# Patient Record
Sex: FEMALE | Race: WHITE | Employment: OTHER | URBAN - METROPOLITAN AREA
[De-identification: names, ages, dates, MRNs, and addresses within clinical notes are randomized per-mention and may not be internally consistent; named-entity substitution may affect disease eponyms.]

---

## 2017-01-01 ENCOUNTER — OFFICE VISIT (OUTPATIENT)
Dept: PODIATRY CLINIC | Facility: CLINIC | Age: 82
End: 2017-01-01

## 2017-01-01 ENCOUNTER — OFFICE VISIT (OUTPATIENT)
Dept: INTERNAL MEDICINE CLINIC | Facility: CLINIC | Age: 82
End: 2017-01-01

## 2017-01-01 ENCOUNTER — HOSPITAL ENCOUNTER (OUTPATIENT)
Dept: GENERAL RADIOLOGY | Age: 82
Discharge: HOME | End: 2017-01-01
Attending: PODIATRIST
Payer: MEDICARE

## 2017-01-01 ENCOUNTER — TELEPHONE (OUTPATIENT)
Dept: INTERNAL MEDICINE CLINIC | Facility: CLINIC | Age: 82
End: 2017-01-01

## 2017-01-01 ENCOUNTER — APPOINTMENT (OUTPATIENT)
Dept: GENERAL RADIOLOGY | Facility: HOSPITAL | Age: 82
End: 2017-01-01
Payer: MEDICARE

## 2017-01-01 ENCOUNTER — APPOINTMENT (OUTPATIENT)
Dept: GENERAL RADIOLOGY | Facility: HOSPITAL | Age: 82
DRG: 871 | End: 2017-01-01
Attending: EMERGENCY MEDICINE
Payer: MEDICARE

## 2017-01-01 ENCOUNTER — HOSPITAL ENCOUNTER (EMERGENCY)
Facility: HOSPITAL | Age: 82
Discharge: HOME | End: 2017-01-01
Payer: MEDICARE

## 2017-01-01 ENCOUNTER — HOSPITAL ENCOUNTER (INPATIENT)
Facility: HOSPITAL | Age: 82
LOS: 3 days | Discharge: NURSING, REHAB OR LONG-TERM CARE | DRG: 184 | End: 2017-01-01
Attending: EMERGENCY MEDICINE | Admitting: HOSPITALIST
Payer: MEDICARE

## 2017-01-01 ENCOUNTER — TELEPHONE (OUTPATIENT)
Dept: OTHER | Age: 82
End: 2017-01-01

## 2017-01-01 ENCOUNTER — HOSPITAL ENCOUNTER (OUTPATIENT)
Dept: MAMMOGRAPHY | Age: 82
Discharge: HOME | End: 2017-01-01
Attending: INTERNAL MEDICINE
Payer: MEDICARE

## 2017-01-01 ENCOUNTER — APPOINTMENT (OUTPATIENT)
Dept: GENERAL RADIOLOGY | Facility: HOSPITAL | Age: 82
DRG: 184 | End: 2017-01-01
Attending: EMERGENCY MEDICINE
Payer: MEDICARE

## 2017-01-01 ENCOUNTER — HOSPITAL ENCOUNTER (OUTPATIENT)
Dept: GENERAL RADIOLOGY | Age: 82
Discharge: HOME | End: 2017-01-01
Attending: INTERNAL MEDICINE
Payer: MEDICARE

## 2017-01-01 ENCOUNTER — APPOINTMENT (OUTPATIENT)
Dept: LAB | Age: 82
End: 2017-01-01
Attending: INTERNAL MEDICINE
Payer: MEDICARE

## 2017-01-01 ENCOUNTER — TELEPHONE (OUTPATIENT)
Dept: FAMILY MEDICINE CLINIC | Facility: CLINIC | Age: 82
End: 2017-01-01

## 2017-01-01 ENCOUNTER — HOSPITAL ENCOUNTER (INPATIENT)
Facility: HOSPITAL | Age: 82
LOS: 1 days | Discharge: HOSPICE | DRG: 871 | End: 2017-01-01
Attending: EMERGENCY MEDICINE | Admitting: INTERNAL MEDICINE
Payer: MEDICARE

## 2017-01-01 ENCOUNTER — HOSPITAL ENCOUNTER (INPATIENT)
Facility: HOSPITAL | Age: 82
LOS: 1 days | DRG: 871 | End: 2017-01-01
Attending: EMERGENCY MEDICINE | Admitting: EMERGENCY MEDICINE
Payer: OTHER MISCELLANEOUS

## 2017-01-01 VITALS
DIASTOLIC BLOOD PRESSURE: 70 MMHG | RESPIRATION RATE: 16 BRPM | HEART RATE: 73 BPM | HEIGHT: 62 IN | TEMPERATURE: 97 F | OXYGEN SATURATION: 97 % | WEIGHT: 182 LBS | BODY MASS INDEX: 33.49 KG/M2 | SYSTOLIC BLOOD PRESSURE: 105 MMHG

## 2017-01-01 VITALS
DIASTOLIC BLOOD PRESSURE: 73 MMHG | TEMPERATURE: 98 F | OXYGEN SATURATION: 88 % | SYSTOLIC BLOOD PRESSURE: 126 MMHG | BODY MASS INDEX: 36 KG/M2 | WEIGHT: 196 LBS

## 2017-01-01 VITALS
OXYGEN SATURATION: 87 % | RESPIRATION RATE: 19 BRPM | HEART RATE: 80 BPM | DIASTOLIC BLOOD PRESSURE: 45 MMHG | SYSTOLIC BLOOD PRESSURE: 58 MMHG

## 2017-01-01 VITALS
RESPIRATION RATE: 11 BRPM | BODY MASS INDEX: 34.04 KG/M2 | TEMPERATURE: 98 F | HEIGHT: 62 IN | OXYGEN SATURATION: 91 % | SYSTOLIC BLOOD PRESSURE: 138 MMHG | WEIGHT: 185 LBS | HEART RATE: 71 BPM | DIASTOLIC BLOOD PRESSURE: 84 MMHG

## 2017-01-01 VITALS
DIASTOLIC BLOOD PRESSURE: 65 MMHG | HEART RATE: 62 BPM | SYSTOLIC BLOOD PRESSURE: 99 MMHG | WEIGHT: 179.5 LBS | BODY MASS INDEX: 33 KG/M2

## 2017-01-01 VITALS
RESPIRATION RATE: 16 BRPM | OXYGEN SATURATION: 84 % | DIASTOLIC BLOOD PRESSURE: 57 MMHG | HEART RATE: 66 BPM | TEMPERATURE: 98 F | SYSTOLIC BLOOD PRESSURE: 88 MMHG

## 2017-01-01 VITALS
OXYGEN SATURATION: 93 % | HEART RATE: 87 BPM | DIASTOLIC BLOOD PRESSURE: 61 MMHG | BODY MASS INDEX: 35.88 KG/M2 | HEIGHT: 62 IN | WEIGHT: 195 LBS | SYSTOLIC BLOOD PRESSURE: 92 MMHG | RESPIRATION RATE: 35 BRPM | TEMPERATURE: 99 F

## 2017-01-01 VITALS
HEART RATE: 59 BPM | WEIGHT: 185 LBS | TEMPERATURE: 98 F | HEIGHT: 62 IN | SYSTOLIC BLOOD PRESSURE: 128 MMHG | DIASTOLIC BLOOD PRESSURE: 79 MMHG | BODY MASS INDEX: 34.04 KG/M2

## 2017-01-01 DIAGNOSIS — I10 HTN (HYPERTENSION), BENIGN: Primary | ICD-10-CM

## 2017-01-01 DIAGNOSIS — R06.02 SHORTNESS OF BREATH: ICD-10-CM

## 2017-01-01 DIAGNOSIS — M79.675 PAIN OF TOE OF LEFT FOOT: Primary | ICD-10-CM
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
## 2017-01-01 DIAGNOSIS — J18.9 PNEUMONIA DUE TO INFECTIOUS ORGANISM, UNSPECIFIED LATERALITY, UNSPECIFIED PART OF LUNG: ICD-10-CM

## 2017-01-01 DIAGNOSIS — M79.671 RIGHT FOOT PAIN: ICD-10-CM

## 2017-01-01 DIAGNOSIS — B35.1 DERMATOPHYTOSIS OF NAIL: ICD-10-CM

## 2017-01-01 DIAGNOSIS — R29.6 MULTIPLE FALLS: Primary | ICD-10-CM

## 2017-01-01 DIAGNOSIS — R06.00 DYSPNEA, UNSPECIFIED TYPE: ICD-10-CM

## 2017-01-01 DIAGNOSIS — M47.815 SPONDYLOSIS OF THORACOLUMBAR REGION WITHOUT MYELOPATHY OR RADICULOPATHY: ICD-10-CM

## 2017-01-01 DIAGNOSIS — M79.671 FOOT PAIN, RIGHT: ICD-10-CM

## 2017-01-01 DIAGNOSIS — N18.30 CKD (CHRONIC KIDNEY DISEASE) STAGE 3, GFR 30-59 ML/MIN (HCC): ICD-10-CM

## 2017-01-01 DIAGNOSIS — S92.909S CLOSED FRACTURE OF FOOT, UNSPECIFIED LATERALITY, SEQUELA: Primary | ICD-10-CM

## 2017-01-01 DIAGNOSIS — M79.674 PAIN OF TOE OF RIGHT FOOT: ICD-10-CM

## 2017-01-01 DIAGNOSIS — I95.9 HYPOTENSION, UNSPECIFIED HYPOTENSION TYPE: ICD-10-CM

## 2017-01-01 DIAGNOSIS — R60.0 BILATERAL LEG EDEMA: ICD-10-CM

## 2017-01-01 DIAGNOSIS — K21.9 GASTROESOPHAGEAL REFLUX DISEASE, ESOPHAGITIS PRESENCE NOT SPECIFIED: ICD-10-CM

## 2017-01-01 DIAGNOSIS — I10 HTN (HYPERTENSION), BENIGN: ICD-10-CM

## 2017-01-01 DIAGNOSIS — K92.2 GASTROINTESTINAL HEMORRHAGE, UNSPECIFIED GASTROINTESTINAL HEMORRHAGE TYPE: Primary | ICD-10-CM

## 2017-01-01 DIAGNOSIS — S09.90XA TRAUMATIC INJURY OF HEAD, INITIAL ENCOUNTER: ICD-10-CM

## 2017-01-01 DIAGNOSIS — R57.8 HEMORRHAGIC SHOCK (HCC): ICD-10-CM

## 2017-01-01 DIAGNOSIS — S22.41XA CLOSED FRACTURE OF MULTIPLE RIBS OF RIGHT SIDE, INITIAL ENCOUNTER: ICD-10-CM

## 2017-01-01 DIAGNOSIS — J44.1 COPD EXACERBATION (HCC): ICD-10-CM

## 2017-01-01 DIAGNOSIS — Z12.31 ENCOUNTER FOR SCREENING MAMMOGRAM FOR BREAST CANCER: Primary | ICD-10-CM

## 2017-01-01 DIAGNOSIS — M79.671 RIGHT FOOT PAIN: Primary | ICD-10-CM

## 2017-01-01 DIAGNOSIS — Z12.31 ENCOUNTER FOR SCREENING MAMMOGRAM FOR BREAST CANCER: ICD-10-CM

## 2017-01-01 DIAGNOSIS — R09.02 HYPOXIA: ICD-10-CM

## 2017-01-01 DIAGNOSIS — I50.9 ACUTE ON CHRONIC CONGESTIVE HEART FAILURE, UNSPECIFIED CONGESTIVE HEART FAILURE TYPE: Primary | ICD-10-CM

## 2017-01-01 PROCEDURE — G0463 HOSPITAL OUTPT CLINIC VISIT: HCPCS | Performed by: INTERNAL MEDICINE

## 2017-01-01 PROCEDURE — 81001 URINALYSIS AUTO W/SCOPE: CPT

## 2017-01-01 PROCEDURE — 77067 SCR MAMMO BI INCL CAD: CPT | Performed by: INTERNAL MEDICINE

## 2017-01-01 PROCEDURE — 73630 X-RAY EXAM OF FOOT: CPT | Performed by: INTERNAL MEDICINE

## 2017-01-01 PROCEDURE — 30233K1 TRANSFUSION OF NONAUTOLOGOUS FROZEN PLASMA INTO PERIPHERAL VEIN, PERCUTANEOUS APPROACH: ICD-10-PCS | Performed by: HOSPITALIST

## 2017-01-01 PROCEDURE — 96375 TX/PRO/DX INJ NEW DRUG ADDON: CPT

## 2017-01-01 PROCEDURE — 99223 1ST HOSP IP/OBS HIGH 75: CPT | Performed by: HOSPITALIST

## 2017-01-01 PROCEDURE — 99233 SBSQ HOSP IP/OBS HIGH 50: CPT | Performed by: INTERNAL MEDICINE

## 2017-01-01 PROCEDURE — 93010 ELECTROCARDIOGRAM REPORT: CPT | Performed by: EMERGENCY MEDICINE

## 2017-01-01 PROCEDURE — 11721 DEBRIDE NAIL 6 OR MORE: CPT | Performed by: PODIATRIST

## 2017-01-01 PROCEDURE — 99285 EMERGENCY DEPT VISIT HI MDM: CPT

## 2017-01-01 PROCEDURE — 99214 OFFICE O/P EST MOD 30 MIN: CPT | Performed by: INTERNAL MEDICINE

## 2017-01-01 PROCEDURE — 71010 XR CHEST AP PORTABLE  (CPT=71010): CPT | Performed by: EMERGENCY MEDICINE

## 2017-01-01 PROCEDURE — 99233 SBSQ HOSP IP/OBS HIGH 50: CPT | Performed by: HOSPITALIST

## 2017-01-01 PROCEDURE — 02HV33Z INSERTION OF INFUSION DEVICE INTO SUPERIOR VENA CAVA, PERCUTANEOUS APPROACH: ICD-10-PCS | Performed by: EMERGENCY MEDICINE

## 2017-01-01 PROCEDURE — 99291 CRITICAL CARE FIRST HOUR: CPT | Performed by: INTERNAL MEDICINE

## 2017-01-01 PROCEDURE — 99222 1ST HOSP IP/OBS MODERATE 55: CPT | Performed by: REGISTERED NURSE

## 2017-01-01 PROCEDURE — 99213 OFFICE O/P EST LOW 20 MIN: CPT | Performed by: INTERNAL MEDICINE

## 2017-01-01 PROCEDURE — 80053 COMPREHEN METABOLIC PANEL: CPT

## 2017-01-01 PROCEDURE — 73630 X-RAY EXAM OF FOOT: CPT | Performed by: PODIATRIST

## 2017-01-01 PROCEDURE — 71101 X-RAY EXAM UNILAT RIBS/CHEST: CPT | Performed by: EMERGENCY MEDICINE

## 2017-01-01 PROCEDURE — 71010 XR CHEST AP PORTABLE  (CPT=71010): CPT

## 2017-01-01 PROCEDURE — 99239 HOSP IP/OBS DSCHRG MGMT >30: CPT | Performed by: HOSPITALIST

## 2017-01-01 PROCEDURE — 36415 COLL VENOUS BLD VENIPUNCTURE: CPT

## 2017-01-01 PROCEDURE — 30233N1 TRANSFUSION OF NONAUTOLOGOUS RED BLOOD CELLS INTO PERIPHERAL VEIN, PERCUTANEOUS APPROACH: ICD-10-PCS | Performed by: HOSPITALIST

## 2017-01-01 PROCEDURE — 94640 AIRWAY INHALATION TREATMENT: CPT

## 2017-01-01 PROCEDURE — 96374 THER/PROPH/DIAG INJ IV PUSH: CPT

## 2017-01-01 PROCEDURE — 80048 BASIC METABOLIC PNL TOTAL CA: CPT

## 2017-01-01 PROCEDURE — 85025 COMPLETE CBC W/AUTO DIFF WBC: CPT

## 2017-01-01 PROCEDURE — 93005 ELECTROCARDIOGRAM TRACING: CPT

## 2017-01-01 PROCEDURE — 84484 ASSAY OF TROPONIN QUANT: CPT | Performed by: EMERGENCY MEDICINE

## 2017-01-01 PROCEDURE — 83880 ASSAY OF NATRIURETIC PEPTIDE: CPT

## 2017-01-01 PROCEDURE — 99223 1ST HOSP IP/OBS HIGH 75: CPT | Performed by: INTERNAL MEDICINE

## 2017-01-01 RX ORDER — OMEPRAZOLE 20 MG/1
CAPSULE, DELAYED RELEASE ORAL
Refills: 0 | COMMUNITY
Start: 2017-01-01 | End: 2017-01-01

## 2017-01-01 RX ORDER — MAGNESIUM OXIDE 400 MG (241.3 MG MAGNESIUM) TABLET
400 TABLET ONCE
Status: DISCONTINUED | OUTPATIENT
Start: 2017-01-01 | End: 2017-01-01

## 2017-01-01 RX ORDER — DEXTROSE AND SODIUM CHLORIDE 5; .9 G/100ML; G/100ML
INJECTION, SOLUTION INTRAVENOUS CONTINUOUS
Status: DISCONTINUED | OUTPATIENT
Start: 2017-01-01 | End: 2017-01-01

## 2017-01-01 RX ORDER — MORPHINE SULFATE 2 MG/ML
2 INJECTION, SOLUTION INTRAMUSCULAR; INTRAVENOUS EVERY 2 HOUR PRN
Status: DISCONTINUED | OUTPATIENT
Start: 2017-01-01 | End: 2017-01-01

## 2017-01-01 RX ORDER — MORPHINE SULFATE 2 MG/ML
2 INJECTION, SOLUTION INTRAMUSCULAR; INTRAVENOUS
Status: DISCONTINUED | OUTPATIENT
Start: 2017-01-01 | End: 2017-12-12

## 2017-01-01 RX ORDER — 0.9 % SODIUM CHLORIDE 0.9 %
VIAL (ML) INJECTION
Status: DISPENSED
Start: 2017-01-01 | End: 2017-01-01

## 2017-01-01 RX ORDER — LORAZEPAM 2 MG/ML
1 INJECTION INTRAMUSCULAR EVERY 4 HOURS PRN
Status: CANCELLED | OUTPATIENT
Start: 2017-01-01

## 2017-01-01 RX ORDER — HYDROCODONE BITARTRATE AND ACETAMINOPHEN 7.5; 325 MG/1; MG/1
1 TABLET ORAL EVERY 6 HOURS PRN
Qty: 60 TABLET | Refills: 0 | Status: ON HOLD | OUTPATIENT
Start: 2017-01-01 | End: 2017-01-01

## 2017-01-01 RX ORDER — DOCUSATE SODIUM 100 MG/1
100 CAPSULE, LIQUID FILLED ORAL 2 TIMES DAILY
Status: DISCONTINUED | OUTPATIENT
Start: 2017-01-01 | End: 2017-01-01

## 2017-01-01 RX ORDER — ONDANSETRON 2 MG/ML
4 INJECTION INTRAMUSCULAR; INTRAVENOUS EVERY 6 HOURS PRN
Status: DISCONTINUED | OUTPATIENT
Start: 2017-01-01 | End: 2017-01-01

## 2017-01-01 RX ORDER — PANTOPRAZOLE SODIUM 20 MG/1
20 TABLET, DELAYED RELEASE ORAL
Status: DISCONTINUED | OUTPATIENT
Start: 2017-01-01 | End: 2017-01-01

## 2017-01-01 RX ORDER — ASPIRIN 325 MG
325 TABLET, DELAYED RELEASE (ENTERIC COATED) ORAL DAILY
Status: DISCONTINUED | OUTPATIENT
Start: 2017-01-01 | End: 2017-01-01

## 2017-01-01 RX ORDER — MELATONIN
400 DAILY
Status: DISCONTINUED | OUTPATIENT
Start: 2017-01-01 | End: 2017-01-01

## 2017-01-01 RX ORDER — FUROSEMIDE 10 MG/ML
40 INJECTION INTRAMUSCULAR; INTRAVENOUS ONCE
Status: COMPLETED | OUTPATIENT
Start: 2017-01-01 | End: 2017-01-01

## 2017-01-01 RX ORDER — ACETAMINOPHEN 160 MG/5ML
650 SOLUTION ORAL EVERY 6 HOURS PRN
Status: DISCONTINUED | OUTPATIENT
Start: 2017-01-01 | End: 2017-12-12

## 2017-01-01 RX ORDER — IPRATROPIUM BROMIDE AND ALBUTEROL SULFATE 2.5; .5 MG/3ML; MG/3ML
3 SOLUTION RESPIRATORY (INHALATION) ONCE
Status: COMPLETED | OUTPATIENT
Start: 2017-01-01 | End: 2017-01-01

## 2017-01-01 RX ORDER — MORPHINE SULFATE 2 MG/ML
2 INJECTION, SOLUTION INTRAMUSCULAR; INTRAVENOUS
Status: DISCONTINUED | OUTPATIENT
Start: 2017-01-01 | End: 2017-01-01

## 2017-01-01 RX ORDER — LEVOTHYROXINE SODIUM 112 UG/1
TABLET ORAL
Qty: 90 TABLET | Refills: 0 | Status: SHIPPED | OUTPATIENT
Start: 2017-01-01 | End: 2017-01-01

## 2017-01-01 RX ORDER — MORPHINE SULFATE 2 MG/ML
INJECTION, SOLUTION INTRAMUSCULAR; INTRAVENOUS
Status: COMPLETED
Start: 2017-01-01 | End: 2017-01-01

## 2017-01-01 RX ORDER — FUROSEMIDE 20 MG/1
20 TABLET ORAL 2 TIMES DAILY
Qty: 60 TABLET | Refills: 0 | Status: SHIPPED | OUTPATIENT
Start: 2017-01-01 | End: 2017-01-01

## 2017-01-01 RX ORDER — ACETAMINOPHEN 650 MG/1
650 SUPPOSITORY RECTAL EVERY 6 HOURS PRN
Status: CANCELLED | OUTPATIENT
Start: 2017-01-01

## 2017-01-01 RX ORDER — GLYCOPYRROLATE 0.2 MG/ML
0.4 INJECTION, SOLUTION INTRAMUSCULAR; INTRAVENOUS
Status: DISCONTINUED | OUTPATIENT
Start: 2017-01-01 | End: 2017-12-12

## 2017-01-01 RX ORDER — ZOLPIDEM TARTRATE 5 MG/1
TABLET ORAL
Qty: 90 TABLET | Refills: 0 | OUTPATIENT
Start: 2017-01-01 | End: 2017-01-01

## 2017-01-01 RX ORDER — LATANOPROST 50 UG/ML
1 SOLUTION/ DROPS OPHTHALMIC NIGHTLY
Status: DISCONTINUED | OUTPATIENT
Start: 2017-01-01 | End: 2017-01-01

## 2017-01-01 RX ORDER — GUAIFENESIN 600 MG
600 TABLET, EXTENDED RELEASE 12 HR ORAL 2 TIMES DAILY
Status: DISCONTINUED | OUTPATIENT
Start: 2017-01-01 | End: 2017-01-01

## 2017-01-01 RX ORDER — ATROPINE SULFATE 10 MG/ML
2 SOLUTION/ DROPS OPHTHALMIC EVERY 2 HOUR PRN
Status: DISCONTINUED | OUTPATIENT
Start: 2017-01-01 | End: 2017-12-12

## 2017-01-01 RX ORDER — MAGNESIUM OXIDE 400 MG (241.3 MG MAGNESIUM) TABLET
200 TABLET DAILY
Status: DISCONTINUED | OUTPATIENT
Start: 2017-01-01 | End: 2017-01-01

## 2017-01-01 RX ORDER — SCOLOPAMINE TRANSDERMAL SYSTEM 1 MG/1
1 PATCH, EXTENDED RELEASE TRANSDERMAL
Status: DISCONTINUED | OUTPATIENT
Start: 2017-01-01 | End: 2017-01-01

## 2017-01-01 RX ORDER — HYDROCODONE BITARTRATE AND ACETAMINOPHEN 5; 325 MG/1; MG/1
2 TABLET ORAL EVERY 4 HOURS PRN
Status: DISCONTINUED | OUTPATIENT
Start: 2017-01-01 | End: 2017-01-01

## 2017-01-01 RX ORDER — SCOLOPAMINE TRANSDERMAL SYSTEM 1 MG/1
1 PATCH, EXTENDED RELEASE TRANSDERMAL
Status: CANCELLED | OUTPATIENT
Start: 2017-01-01

## 2017-01-01 RX ORDER — DOPAMINE HYDROCHLORIDE 160 MG/100ML
INJECTION, SOLUTION INTRAVENOUS CONTINUOUS
Status: DISCONTINUED | OUTPATIENT
Start: 2017-01-01 | End: 2017-01-01

## 2017-01-01 RX ORDER — MORPHINE SULFATE 2 MG/ML
2 INJECTION, SOLUTION INTRAMUSCULAR; INTRAVENOUS ONCE
Status: COMPLETED | OUTPATIENT
Start: 2017-01-01 | End: 2017-01-01

## 2017-01-01 RX ORDER — LORAZEPAM 2 MG/ML
2 INJECTION INTRAMUSCULAR EVERY 4 HOURS PRN
Status: DISCONTINUED | OUTPATIENT
Start: 2017-01-01 | End: 2017-12-12

## 2017-01-01 RX ORDER — LORAZEPAM 2 MG/ML
1 INJECTION INTRAMUSCULAR EVERY 4 HOURS PRN
Status: DISCONTINUED | OUTPATIENT
Start: 2017-01-01 | End: 2017-12-12

## 2017-01-01 RX ORDER — SODIUM CHLORIDE 0.9 % (FLUSH) 0.9 %
3 SYRINGE (ML) INJECTION AS NEEDED
Status: DISCONTINUED | OUTPATIENT
Start: 2017-01-01 | End: 2017-01-01

## 2017-01-01 RX ORDER — SODIUM CHLORIDE 0.9 % (FLUSH) 0.9 %
10 SYRINGE (ML) INJECTION AS NEEDED
Status: DISCONTINUED | OUTPATIENT
Start: 2017-01-01 | End: 2017-12-12

## 2017-01-01 RX ORDER — 0.9 % SODIUM CHLORIDE 0.9 %
VIAL (ML) INJECTION
Status: COMPLETED
Start: 2017-01-01 | End: 2017-01-01

## 2017-01-01 RX ORDER — METHYLPREDNISOLONE SODIUM SUCCINATE 125 MG/2ML
125 INJECTION, POWDER, LYOPHILIZED, FOR SOLUTION INTRAMUSCULAR; INTRAVENOUS ONCE
Status: COMPLETED | OUTPATIENT
Start: 2017-01-01 | End: 2017-01-01

## 2017-01-01 RX ORDER — BISACODYL 10 MG
10 SUPPOSITORY, RECTAL RECTAL
Status: DISCONTINUED | OUTPATIENT
Start: 2017-01-01 | End: 2017-12-12

## 2017-01-01 RX ORDER — UMECLIDINIUM BROMIDE AND VILANTEROL TRIFENATATE 62.5; 25 UG/1; UG/1
1 POWDER RESPIRATORY (INHALATION) DAILY
Refills: 0 | COMMUNITY
Start: 2016-01-01 | End: 2017-01-01

## 2017-01-01 RX ORDER — GLYCOPYRROLATE 0.2 MG/ML
0.4 INJECTION, SOLUTION INTRAMUSCULAR; INTRAVENOUS
Status: DISCONTINUED | OUTPATIENT
Start: 2017-01-01 | End: 2017-01-01

## 2017-01-01 RX ORDER — POLYETHYLENE GLYCOL 3350 17 G/17G
17 POWDER, FOR SOLUTION ORAL DAILY PRN
Status: DISCONTINUED | OUTPATIENT
Start: 2017-01-01 | End: 2017-01-01

## 2017-01-01 RX ORDER — 0.9 % SODIUM CHLORIDE 0.9 %
VIAL (ML) INJECTION
Status: DISCONTINUED
Start: 2017-01-01 | End: 2017-01-01

## 2017-01-01 RX ORDER — ACETAMINOPHEN 160 MG/5ML
650 SOLUTION ORAL EVERY 6 HOURS PRN
Status: CANCELLED | OUTPATIENT
Start: 2017-01-01

## 2017-01-01 RX ORDER — SODIUM CHLORIDE 0.9 % (FLUSH) 0.9 %
10 SYRINGE (ML) INJECTION AS NEEDED
Status: DISCONTINUED | OUTPATIENT
Start: 2017-01-01 | End: 2017-01-01

## 2017-01-01 RX ORDER — CALCIUM CARBONATE 500(1250)
500 TABLET ORAL DAILY
Status: DISCONTINUED | OUTPATIENT
Start: 2017-01-01 | End: 2017-01-01

## 2017-01-01 RX ORDER — CHOLECALCIFEROL (VITAMIN D3) 125 MCG
1000 CAPSULE ORAL DAILY
Status: DISCONTINUED | OUTPATIENT
Start: 2017-01-01 | End: 2017-01-01

## 2017-01-01 RX ORDER — GLYCOPYRROLATE 0.2 MG/ML
0.4 INJECTION, SOLUTION INTRAMUSCULAR; INTRAVENOUS
Status: CANCELLED | OUTPATIENT
Start: 2017-01-01

## 2017-01-01 RX ORDER — HYDROCODONE BITARTRATE AND ACETAMINOPHEN 5; 325 MG/1; MG/1
1 TABLET ORAL EVERY 4 HOURS PRN
Status: DISCONTINUED | OUTPATIENT
Start: 2017-01-01 | End: 2017-01-01

## 2017-01-01 RX ORDER — LORAZEPAM 2 MG/ML
1 INJECTION INTRAMUSCULAR EVERY 4 HOURS PRN
Status: DISCONTINUED | OUTPATIENT
Start: 2017-01-01 | End: 2017-01-01

## 2017-01-01 RX ORDER — ZOLPIDEM TARTRATE 5 MG/1
5 TABLET ORAL NIGHTLY PRN
Status: DISCONTINUED | OUTPATIENT
Start: 2017-01-01 | End: 2017-01-01

## 2017-01-01 RX ORDER — MORPHINE SULFATE 4 MG/ML
4 INJECTION, SOLUTION INTRAMUSCULAR; INTRAVENOUS EVERY 2 HOUR PRN
Status: DISCONTINUED | OUTPATIENT
Start: 2017-01-01 | End: 2017-01-01

## 2017-01-01 RX ORDER — PYRIDOXINE HCL (VITAMIN B6) 100 MG
TABLET ORAL DAILY
Status: DISCONTINUED | OUTPATIENT
Start: 2017-01-01 | End: 2017-01-01 | Stop reason: RX

## 2017-01-01 RX ORDER — DOPAMINE HYDROCHLORIDE 160 MG/100ML
INJECTION, SOLUTION INTRAVENOUS
Status: COMPLETED
Start: 2017-01-01 | End: 2017-01-01

## 2017-01-01 RX ORDER — LORAZEPAM 2 MG/ML
0.5 INJECTION INTRAMUSCULAR EVERY 4 HOURS PRN
Status: DISCONTINUED | OUTPATIENT
Start: 2017-01-01 | End: 2017-12-12

## 2017-01-01 RX ORDER — ACETAMINOPHEN 325 MG/1
650 TABLET ORAL EVERY 6 HOURS PRN
Status: DISCONTINUED | OUTPATIENT
Start: 2017-01-01 | End: 2017-01-01

## 2017-01-01 RX ORDER — SCOLOPAMINE TRANSDERMAL SYSTEM 1 MG/1
1 PATCH, EXTENDED RELEASE TRANSDERMAL
Status: DISCONTINUED | OUTPATIENT
Start: 2017-01-01 | End: 2017-12-12

## 2017-01-01 RX ORDER — HYDROCODONE BITARTRATE AND ACETAMINOPHEN 7.5; 325 MG/1; MG/1
1 TABLET ORAL EVERY 6 HOURS PRN
Qty: 60 TABLET | Refills: 0 | Status: SHIPPED | OUTPATIENT
Start: 2017-01-01 | End: 2017-01-01

## 2017-01-01 RX ORDER — POTASSIUM CHLORIDE 20 MEQ/1
40 TABLET, EXTENDED RELEASE ORAL ONCE
Status: COMPLETED | OUTPATIENT
Start: 2017-01-01 | End: 2017-01-01

## 2017-01-01 RX ORDER — NICOTINE POLACRILEX 4 MG/1
1 GUM, CHEWING ORAL DAILY
Qty: 90 TABLET | Refills: 3 | Status: SHIPPED | OUTPATIENT
Start: 2017-01-01 | End: 2017-01-01

## 2017-01-01 RX ORDER — IPRATROPIUM BROMIDE AND ALBUTEROL SULFATE 2.5; .5 MG/3ML; MG/3ML
3 SOLUTION RESPIRATORY (INHALATION) EVERY 6 HOURS PRN
Status: DISCONTINUED | OUTPATIENT
Start: 2017-01-01 | End: 2017-01-01

## 2017-01-01 RX ORDER — MORPHINE SULFATE 2 MG/ML
1 INJECTION, SOLUTION INTRAMUSCULAR; INTRAVENOUS EVERY 2 HOUR PRN
Status: DISCONTINUED | OUTPATIENT
Start: 2017-01-01 | End: 2017-01-01

## 2017-01-01 RX ORDER — DIPHENHYDRAMINE HCL 25 MG
25 CAPSULE ORAL EVERY 6 HOURS PRN
Status: DISCONTINUED | OUTPATIENT
Start: 2017-01-01 | End: 2017-01-01

## 2017-01-01 RX ORDER — HYDROCODONE BITARTRATE AND ACETAMINOPHEN 7.5; 325 MG/1; MG/1
1 TABLET ORAL EVERY 6 HOURS PRN
Qty: 30 TABLET | Refills: 0 | Status: SHIPPED | OUTPATIENT
Start: 2017-01-01 | End: 2017-01-01

## 2017-01-01 RX ORDER — ACETAMINOPHEN 650 MG/1
650 SUPPOSITORY RECTAL EVERY 6 HOURS PRN
Status: DISCONTINUED | OUTPATIENT
Start: 2017-01-01 | End: 2017-01-01

## 2017-01-01 RX ORDER — ACETAMINOPHEN 160 MG/5ML
650 SOLUTION ORAL EVERY 6 HOURS PRN
Status: DISCONTINUED | OUTPATIENT
Start: 2017-01-01 | End: 2017-01-01

## 2017-01-01 RX ORDER — BISACODYL 10 MG
10 SUPPOSITORY, RECTAL RECTAL
Status: DISCONTINUED | OUTPATIENT
Start: 2017-01-01 | End: 2017-01-01

## 2017-01-01 RX ORDER — FUROSEMIDE 20 MG/1
20 TABLET ORAL 2 TIMES DAILY
Qty: 60 TABLET | Refills: 0 | Status: ON HOLD | OUTPATIENT
Start: 2017-01-01 | End: 2017-01-01

## 2017-01-01 RX ORDER — MORPHINE SULFATE 2 MG/ML
2 INJECTION, SOLUTION INTRAMUSCULAR; INTRAVENOUS
Status: CANCELLED | OUTPATIENT
Start: 2017-01-01

## 2017-01-01 RX ORDER — AMLODIPINE BESYLATE 2.5 MG/1
TABLET ORAL
Refills: 0 | COMMUNITY
Start: 2017-01-01 | End: 2017-01-01 | Stop reason: ALTCHOICE

## 2017-01-01 RX ORDER — FUROSEMIDE 20 MG/1
TABLET ORAL
Qty: 60 TABLET | Refills: 0 | Status: SHIPPED | COMMUNITY
Start: 2017-01-01 | End: 2017-01-01

## 2017-01-01 RX ORDER — LEVOTHYROXINE SODIUM 112 UG/1
112 TABLET ORAL
Status: DISCONTINUED | OUTPATIENT
Start: 2017-01-01 | End: 2017-01-01

## 2017-01-01 RX ORDER — ACETAMINOPHEN 650 MG/1
650 SUPPOSITORY RECTAL EVERY 6 HOURS PRN
Status: DISCONTINUED | OUTPATIENT
Start: 2017-01-01 | End: 2017-12-12

## 2017-01-01 RX ORDER — HEPARIN SODIUM 5000 [USP'U]/ML
5000 INJECTION, SOLUTION INTRAVENOUS; SUBCUTANEOUS EVERY 12 HOURS SCHEDULED
Status: DISCONTINUED | OUTPATIENT
Start: 2017-01-01 | End: 2017-01-01

## 2017-01-01 RX ORDER — METOPROLOL SUCCINATE 25 MG/1
25 TABLET, EXTENDED RELEASE ORAL DAILY
COMMUNITY
End: 2017-01-01

## 2017-01-01 RX ORDER — ACETAMINOPHEN 325 MG/1
650 TABLET ORAL EVERY 4 HOURS PRN
Status: DISCONTINUED | OUTPATIENT
Start: 2017-01-01 | End: 2017-01-01

## 2017-01-01 RX ORDER — SODIUM CHLORIDE 9 MG/ML
INJECTION, SOLUTION INTRAVENOUS CONTINUOUS
Status: DISCONTINUED | OUTPATIENT
Start: 2017-01-01 | End: 2017-01-01

## 2017-01-01 RX ORDER — GABAPENTIN 300 MG/1
300 CAPSULE ORAL 3 TIMES DAILY
Status: DISCONTINUED | OUTPATIENT
Start: 2017-01-01 | End: 2017-01-01

## 2017-01-01 RX ORDER — METOPROLOL SUCCINATE 25 MG/1
25 TABLET, EXTENDED RELEASE ORAL DAILY
Status: DISCONTINUED | OUTPATIENT
Start: 2017-01-01 | End: 2017-01-01

## 2017-01-01 RX ORDER — SODIUM CHLORIDE 0.9 % (FLUSH) 0.9 %
10 SYRINGE (ML) INJECTION AS NEEDED
Status: CANCELLED | OUTPATIENT
Start: 2017-01-01

## 2017-01-05 NOTE — PROGRESS NOTES
HPI:    Patient ID: Dimitri Palafox is a 80year old female. HPI  This 30-year-old female presents with recurrent pain associated with her fungus toenails. Patient reports previous relief by local debridement.   Review of Systems  I did review present me Solution Place 1 drop into both eyes daily. Disp:  Rfl: 1   Timolol Maleate (TIMOPTIC) 0.5 % Ophthalmic Solution Apply 1 drop to eye nightly. Disp:  Rfl: 1   aspirin  MG Oral Tab EC Take 325 mg by mouth daily.  Disp:  Rfl:    Calcium-Magnesium-Zinc 16

## 2017-02-28 ENCOUNTER — PRIOR ORIGINAL RECORDS (OUTPATIENT)
Dept: OTHER | Age: 82
End: 2017-02-28

## 2017-03-06 NOTE — TELEPHONE ENCOUNTER
Pt is calling state that pharm fax over request twice for medication ZOLPIDEM TARTRATE 5 MG Oral Tab  Pt state that she is completely out of medication     Current Outpatient Prescriptions:  ZOLPIDEM TARTRATE 5 MG Oral Tab TAKE ONE TABLET (5 MG TOTAL) BY M

## 2017-03-23 PROBLEM — K21.9 GASTROESOPHAGEAL REFLUX DISEASE: Status: ACTIVE | Noted: 2017-01-01

## 2017-03-23 NOTE — PROGRESS NOTES
HPI:    Patient ID: Aguilar Freeman is a 80year old female. HPI    Hypertension  This is a chronic problem. The current episode started more than 1 year ago. Condition status: improved, currently controlled.  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  Disp:  Rfl:    AmLODIPine Besylate (NORVASC) 5 MG Oral Tab  Disp:  Rfl: 0   LUMIGAN 0.01 % Ophthalmic Solution Place 1 drop i 6 Encounters:  03/23/17 : 128/79  12/15/16 : 137/78  10/26/16 : 148/80  09/02/16 : 139/78  06/16/16 : 120/79  05/26/16 : 145/76         ASSESSMENT/PLAN:   1. (I10) HTN (hypertension), benign  (primary encounter diagnosis)  Patient's blood pressure was 128/ presence. I have reviewed the chart and discharge instructions (if applicable) and agree that the record reflects my personal performance and is accurate and complete.   Bernard Cerna MD, 3/23/2017, 12:17 PM

## 2017-04-11 NOTE — PROGRESS NOTES
HPI:    Patient ID: Mona Finn is a 80year old female. HPI  55-year-old female presents with recurrent pain associated with her fungus toenails. She reports relief by previous debridement.   Review of Systems  I did review present medical status, m Oral Tab  Disp:  Rfl: 0   LUMIGAN 0.01 % Ophthalmic Solution Place 1 drop into both eyes daily. Disp:  Rfl: 1   Timolol Maleate (TIMOPTIC) 0.5 % Ophthalmic Solution Apply 1 drop to eye nightly.  Disp:  Rfl: 1   aspirin  MG Oral Tab EC Take 325 mg by m

## 2017-05-09 NOTE — TELEPHONE ENCOUNTER
Per pt, she needs her a refill on her Levothyroxine asap send to her pharmacy on file then call her so that her daughter will go and pick it up.       Current Outpatient Prescriptions:                                LEVOTHYROXINE SODIUM 112 MCG Oral Tab RILEY

## 2017-05-10 NOTE — TELEPHONE ENCOUNTER
Pt calling checking status of refill, pt would like call when meds approved. Pt is out of meds and would like rush if possible.

## 2017-05-12 NOTE — TELEPHONE ENCOUNTER
Hypothyroid Medications  Protocol Criteria:  Appointment scheduled in the past 12 months or the next 3 months  TSH resulted in the past 12 months that is normal  Recent Visits       Provider Department Primary Dx    1 month ago Fabiana Smiley MD [facility]

## 2017-06-22 ENCOUNTER — PRIOR ORIGINAL RECORDS (OUTPATIENT)
Dept: OTHER | Age: 82
End: 2017-06-22

## 2017-06-28 ENCOUNTER — MYAURORA ACCOUNT LINK (OUTPATIENT)
Dept: OTHER | Age: 82
End: 2017-06-28

## 2017-06-28 ENCOUNTER — PRIOR ORIGINAL RECORDS (OUTPATIENT)
Dept: OTHER | Age: 82
End: 2017-06-28

## 2017-06-30 ENCOUNTER — PRIOR ORIGINAL RECORDS (OUTPATIENT)
Dept: OTHER | Age: 82
End: 2017-06-30

## 2017-06-30 LAB
ALBUMIN: 2.6 G/DL
ALT (SGPT): 15 U/L
AST (SGOT): 14 U/L
BILIRUBIN TOTAL: 0.5 MG/DL
BUN: 17 MG/DL
CALCIUM: 8.6 MG/DL
CHLORIDE: 109 MEQ/L
CHOLESTEROL, TOTAL: 80 MG/DL
CREATININE, SERUM: 1.13 MG/DL
GLOBULIN: 5.1 G/DL
GLUCOSE: 106 MG/DL
GLUCOSE: 106 MG/DL
HDL CHOLESTEROL: 31 MG/DL
LDL CHOLESTEROL: 27 MG/DL
POTASSIUM, SERUM: 4.4 MEQ/L
PROTEIN, TOTAL: 7.7 G/DL
SGOT (AST): 14 IU/L
SGPT (ALT): 15 IU/L
SODIUM: 138 MEQ/L
TRIGLYCERIDES: 108 MG/DL

## 2017-06-30 NOTE — TELEPHONE ENCOUNTER
Pt said first appt with Dr Kasia Cole specialist  not until 7/11  Caio Guzman Cardiologist wanted her to be seen sooner for air pockets on lungs  Test requests were to be sent to Dr Nadine Walters  Pt not sure what to do-requesting callback to discuss

## 2017-07-01 NOTE — TELEPHONE ENCOUNTER
Pt stlesli this is just an FYI regarding having some papers faxed to Whit Hopping from the cardiologist.

## 2017-07-10 LAB
HEMATOCRIT: 37.3 %
HEMOGLOBIN: 11.2 G/DL
MCH: 25.3 PG
MCHC: 30 G/DL
MCV: 84.2 FL
PLATELETS: 496 K/UL
RED BLOOD COUNT: 4.43 X 10-6/U
THYROID STIMULATING HORMONE: 0.66 MLU/L
WHITE BLOOD COUNT: 8.5 X 10-3/U

## 2017-07-13 PROBLEM — J18.9 PNEUMONIA DUE TO INFECTIOUS ORGANISM: Status: ACTIVE | Noted: 2017-01-01

## 2017-07-13 NOTE — PROGRESS NOTES
HPI:    Patient ID: Narcisa Kessler is a 80year old female. pt /co of trouble breathing. Pt states she has no fever but does fluctuate in temperature throughout the day. Ranging from 80 F in the morning up to 98.1 F throughout the day.   Pt recently had a Packs/day: 2.00      Years: 0.00         Types: Cigarettes  Smokeless tobacco: Never Used                      Comment: quit in 1992  Alcohol use: No                        Current Outpatient Prescriptions:  METOPROLOL TARTRATE IV Inject into the vein.  Dis Oral Tab Take 1,000 mcg by mouth daily. Disp:  Rfl:    AmLODIPine Besylate 2.5 MG Oral Tab  Disp:  Rfl: 0   furosemide 20 MG Oral Tab Take 1 tablet (20 mg total) by mouth 2 (two) times daily.  Disp: 60 tablet Rfl: 0   AmLODIPine Besylate (NORVASC) 5 MG Oral Besylate (Norvasc) 5 mg and Metoprolol Succinate ER (TOPROL-XL) 50 mg.        (R06.02) Shortness of breath  Pt c/o of having trouble breathing.    Plan: pt will continue to use inhaler and oxygen.     (J18.9) Pneumonia due to infectious organism, unspecifie

## 2017-07-20 ENCOUNTER — MYAURORA ACCOUNT LINK (OUTPATIENT)
Dept: OTHER | Age: 82
End: 2017-07-20

## 2017-07-24 ENCOUNTER — PRIOR ORIGINAL RECORDS (OUTPATIENT)
Dept: OTHER | Age: 82
End: 2017-07-24

## 2017-07-25 ENCOUNTER — PRIOR ORIGINAL RECORDS (OUTPATIENT)
Dept: OTHER | Age: 82
End: 2017-07-25

## 2017-07-27 LAB
BUN: 21 MG/DL
CALCIUM: 8.8 MG/DL
CHLORIDE: 102 MEQ/L
CREATININE, SERUM: 1.3 MG/DL
GLUCOSE: 92 MG/DL
POTASSIUM, SERUM: 3.7 MEQ/L
SODIUM: 136 MEQ/L

## 2017-08-28 NOTE — PROGRESS NOTES
HPI:    Patient ID: Beba Camejo is a 80year old female. Fall   The accident occurred 5 to 7 days ago. The fall occurred while walking (Patient was walking with her walker. She fell down. Jarad Solorio got stuck. ). She fell from a height of 3 to 5 ft.  She visual disturbance. Cardiovascular: Negative for chest pain. Neurological: Negative for loss of consciousness, speech difficulty, weakness and headaches. Psychiatric/Behavioral: Negative for behavioral problems.             Current Outpatient Prescrip Calcium-Magnesium-Zinc 167-83-8 MG Oral Tab Take 2 tablets by mouth daily. Disp:  Rfl:    Vitamins C E (CRANBERRY CONCENTRATE OR) Take 1 capsule by mouth daily. Disp:  Rfl:    cyanocobalamin 1000 MCG Oral Tab Take 1,000 mcg by mouth daily.  Disp:  Rfl:

## 2017-09-22 NOTE — TELEPHONE ENCOUNTER
Pt following up on X ray results to be fax to Dr. Marianela Masty office please fax to [phone].   Please advise pt once this has been done

## 2017-09-22 NOTE — TELEPHONE ENCOUNTER
Spoke with pt informed she is to to call Dr. Moncho Murcia office and have them fax results as requested. Pt stts Dr. Shahida Ngo already called her with results. No further action needed.

## 2017-09-29 NOTE — TELEPHONE ENCOUNTER
Candis Mackay from [facility] calling to regarding getting order signed for home health also Candis Mackay needs face to face and most recent office visit and  Notes for medicare purposes . Ariadne Guillermo please advise and fax to [phone]

## 2017-09-29 NOTE — TELEPHONE ENCOUNTER
Received call from Dr. Garret Cooper, director of [facility] spine and podiatry center. Pt has a foot fx, in a walking boot but is unable to get around and perform ADL's.   Refusing SNF placement, requesting order for home health evaluation for assistance during he

## 2017-10-02 NOTE — TELEPHONE ENCOUNTER
[facility] calling as FYI to inform delay in start of care. Start of care will begin today 10/2/17 - will send orders to office to be signed off on.

## 2017-10-02 NOTE — TELEPHONE ENCOUNTER
[facility] calling to advise Dr. Radha Jimenez to sign off on orders for PT/OT/Pulse OX related to COPD for Pt.

## 2017-10-03 NOTE — TELEPHONE ENCOUNTER
Ailyn/[facility] is calling for status of her message. Leyda Bolden can be reached at [phone].

## 2017-10-03 NOTE — TELEPHONE ENCOUNTER
I called Hermelinda Joe for [facility] she stated that she just needs a verbal ok that you will sign orders for pt to have a PT,OT, nursing and a home health Aid and a pulse ox as pt has COPD. She will fax you the orders so you can sign them.

## 2017-10-03 NOTE — TELEPHONE ENCOUNTER
I received a phone call from [facility] and she stated that she wanted to know the update on pt HH. She stated that pt has called her and inform her that a nurse came out yesterday and inform her I will see you in 4 weeks.  No one has showered the p

## 2017-10-10 NOTE — TELEPHONE ENCOUNTER
Order # [id number] and [id number] received from [facility]. Placed in Dr Maria Teresa Mcelroy for signatures.

## 2017-11-03 NOTE — TELEPHONE ENCOUNTER
Pt stts she was informed by Dr. Es Frias to request an order for xray on foot from Dr. Dilshad Chakraborty. Pt stts this will be week 7 since she injured her foot. Dr. Es Frias would like her to repeat xray before appt. Please advise on order.

## 2017-11-06 NOTE — TELEPHONE ENCOUNTER
Dr. Yusfe Cabrera: received a call from [facility] RN. She states podiatrist informed patient to have a repeat xray done of RT foot, fracture 5th metatarsal.  Her last xray was done 9/20/17 seen by Dr Celio Trujillo.        Do you approve?

## 2017-11-07 NOTE — TELEPHONE ENCOUNTER
Spoke with James Muñoz from [facility] and informed her per Dr. Elizabeth Alonso yes he agrees pt should have repeat right foot xray. Order in chart. Ailyn voiced understanding.

## 2017-11-14 NOTE — TELEPHONE ENCOUNTER
Patient needs a copy of the x ray result to be sent to her podiatrist   Send To Dr Edilia Infante  Phone # [phone] -   Fax report to [phone]   He is not with [facility] so he could not find the report.    Patient has apt on 11/22

## 2017-11-16 NOTE — TELEPHONE ENCOUNTER
Xray report faxed to number listed below. Dr. Bard Cerda you can disregard, not sure why message was routed to you.

## 2017-11-23 NOTE — ED PROVIDER NOTES
Patient Seen in: [facility] AND [facility] Emergency Department    History   Patient presents with:  Dyspnea MATEO SOB (respiratory)    Stated Complaint: Difficulty breathing, can't urinate    HPI    Patient presents with complaint of difficulty breathing that sta DAILY BEFORE BREAKFAST   hydrocodone-acetaminophen (NORCO) 7.5-325 MG Oral Tab,  Take 1 tablet by mouth every 6 (six) hours as needed for Pain. take 1 tablet by ORAL route  every 6 hours as needed for pain   Econazole Nitrate 1 % External Cream,  Apply to No cough, no congestion  Cardiovascular: no chest pain  Gastrointestinal: no abdominal pain, no nausea, no vomiting  Genitourinary: no dysuria      Positive for stated complaint: Difficulty breathing, can't urinate  Other systems are as noted in HPI.   Cons receiving the Lasix.   I discussed her diminishment of GFR I recommended follow-up with her doctor we will put her back on her Lasix she has been off of it for over a month she is feeling better at this time she does live alone at home but feels she will be voltage noted            MDM     100% Normal  Pulse oximetry this is on supplemental oxygen    Cardiac Monitor: Normal sinus rhythm    Disposition and Plan     We recommend that you schedule follow up care with a primary care provider within the next three

## 2017-12-04 NOTE — PROGRESS NOTES
HPI:    Patient ID: Umair Sainz is a 80year old female. Pt was recently seen at [facility]. Pt presented with dyspnea MATEO SOB. Pt was prescribe furosemide 20 mg. Hypertension   This is a chronic problem.  The current episode started polyp 1980   • Coronary artery disease    • Glaucoma 1/2010   • History of D&C 2000   • Hypothyroidism    • Kidney stones 1998   • Spinal stenosis 4-27-07   • Unspecified essential hypertension       Past Surgical History:  No date: APPENDECTOMY  2012: CAT every day Disp: 90 tablet Rfl: 3   ANORO ELLIPTA 62.5-25 MCG/INH Inhalation Aerosol Powder, Breath Activated Inhale 1 puff into the lungs daily. Disp:  Rfl: 0   gabapentin 300 MG Oral Cap Take 1 capsule (300 mg total) by mouth 3 (three) times daily.  Disp and breath sounds normal. No respiratory distress. She has no wheezes. She has no rales. She exhibits no tenderness. Musculoskeletal: Normal range of motion. She exhibits edema (lower extremities ).    Neurological: She is alert and oriented to person, pl this encounter    Imaging & Referrals:  None       ID#[id number]  By signing my name below, I, Jaimie Malave,  attest that this documentation has been prepared under the direction and in the presence of RAHEEM Samuel MD.   Electronically Signed: Jaimie Malave

## 2017-12-05 PROBLEM — R29.6 MULTIPLE FALLS: Status: ACTIVE | Noted: 2017-01-01

## 2017-12-05 PROBLEM — R09.02 HYPOXIA: Status: ACTIVE | Noted: 2017-01-01

## 2017-12-05 PROBLEM — S22.41XA CLOSED FRACTURE OF MULTIPLE RIBS OF RIGHT SIDE, INITIAL ENCOUNTER: Status: ACTIVE | Noted: 2017-01-01

## 2017-12-05 NOTE — TELEPHONE ENCOUNTER
Dr. Giovanni Espinosa- Dr. Javy Navarro (Podiatrist) called stts He seen pt for a  fx foot and was released. Apparently Patient seen Chiropractor in their office and told the Chiropractor that she is unable to urinate and has been crawling around the house disoriented.  He

## 2017-12-05 NOTE — ED INITIAL ASSESSMENT (HPI)
Patient received via EMS after PMD did well being check today after patient missed appointment today. Patient c/o generalized weakness, found in bed by EMS. Patient has bruising in various stages, states she has had multiple falls.  Patient lives alone at h

## 2017-12-06 PROBLEM — S22.41XA MULTIPLE CLOSED FRACTURES OF RIBS OF RIGHT SIDE: Status: ACTIVE | Noted: 2017-01-01

## 2017-12-06 NOTE — PROGRESS NOTES
[facility]  Hospitalist Progress  Note     Mona Finn Patient Status:  Inpatient    1929  80year old CSN [id number]   Location 559/55-A Attending Aj Newman MD   Hosp Day # 1 PCP RAHEEM Hare MD     ASSESSMENT/PLAN    A Lab  12/05/17   1645  12/06/17   0645   RBC  4.62  4.48   HGB  12.2  11.8*   HCT  38.5  37.9   MCV  83.2  84.7   MCH  26.4*  26.5*   MCHC  31.8*  31.3*   RDW  18.0*  18.0*   WBC  12.4*  11.6*   PLT  270  242     Recent Labs   Lab  12/04/17   1112  12/05/

## 2017-12-06 NOTE — OCCUPATIONAL THERAPY NOTE
OCCUPATIONAL THERAPY EVALUATION - INPATIENT     Room Number: 559/559-A  Evaluation Date: 12/6/2017  Type of Evaluation: Initial  Presenting Problem:  (multiple falls with Rt 6 and 7th rib fx)    Physician Order: Deion Tai to Occupational Therapy  Reason f • Breast lump 2000   • Cataract of left eye 1998   • Cataract of right eye 9-25-12   • Colon polyp 1980   • Coronary artery disease    • Glaucoma 1/2010   • History of D&C 2000   • Hypothyroidism    • Kidney stones 1998   • Spinal stenosis 4-27-07   • Un extremity strength is within functional limits       ACTIVITIES OF DAILY LIVING ASSESSMENT  AM-PAC ‘6-Clicks’ Inpatient Daily Activity Short Form  How much help from another person does the patient currently need…  -   Putting on and taking off regular low

## 2017-12-06 NOTE — PHYSICAL THERAPY NOTE
PHYSICAL THERAPY EVALUATION - INPATIENT     Room Number: 559/559-A  Evaluation Date: 12/6/2017  Type of Evaluation: Initial  Physician Order: PT Eval and Treat    Presenting Problem: fall with rib fracture 6 and 7 th  Reason for Therapy: Mobility Dysfu hypertension        Past Surgical History  Past Surgical History:  No date: APPENDECTOMY  2012: CATARACT EXTRACTION  No date: CHOLECYSTECTOMY  10-7-08: COLECTOMY  3227,8386: COLONOSCOPY  1980: CORRECT BUNION,SIMPLE  04/07/2013: ELECTROCARDIOGRAM, COMPLETE chair with arms (e.g., wheelchair, bedside commode, etc.): A Little   -   Moving from lying on back to sitting on the side of the bed?: A Lot   How much help from another person does the patient currently need. ..   -   Moving to and from a bed to a chair ( Status    Goal #6    Goal #6  Current Status

## 2017-12-06 NOTE — PLAN OF CARE
Problem: SAFETY ADULT - FALL  Goal: Free from fall injury  INTERVENTIONS:  - Assess pt frequently for physical needs  - Identify cognitive and physical deficits and behaviors that affect risk of falls.   - [place] fall precautions as indicated by assessme

## 2017-12-06 NOTE — H&P
[facility]    PATIENT'S NAME: Donny Joy PHYSICIAN: Evangelista Limon MD   PATIENT ACCOUNT#:   [id number]    LOCATION:  [place] [address]  MEDICAL RECORD #:   [id number]       YOB: 1929  ADMISSION DATE:       12/05/20 home.  No current alcohol, drug, or tobacco use. Gait is unsteady in general.    REVIEW OF SYSTEMS:  The patient reports generalized weakness in the last week or so. She said her gait has been unsteady. She feels generally weak.   She has been having cou patient for possible rehab placement. The patient is not safe to go back to her living conditions, considering her multiple falls. Further recommendations to follow.     Dictated By Demian Romero MD  d: 12/05/2017 20:36:46  t: 12/05/2017 21:44:01  Job 1

## 2017-12-06 NOTE — ED PROVIDER NOTES
Patient Seen in: [facility] AND [facility] Emergency Department    History   Patient presents with:  Fatigue (constitutional, neurologic)    Stated Complaint: weakness     HPI    The patient is an 59-year-old female who lives alone and has had gradually progress Packs/day: 2.00      Years: 0.00         Types: Cigarettes  Smokeless tobacco: Never Used                      Comment: quit in 1992  Alcohol use:  No                Review of Systems    Positive for stated complaint: weakness   Other systems are as note Nursing note and vitals reviewed.     Differential diagnosis includes deconditioning, infection, arrhythmia as cause of falls        ED Course     Labs Reviewed   URINALYSIS WITH CULTURE REFLEX - Abnormal; Notable for the following:        Result Value    L CONCLUSION:  1. Acute, angulated anterolateral right sixth and seventh rib fracture deformities. 2. Suspected small right pleural effusion with associated atelectasis, perhaps sequela of chest wall splinting.  Radiographic followup is recommended to docume

## 2017-12-06 NOTE — PLAN OF CARE
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

## 2017-12-06 NOTE — CM/SW NOTE
SW received MDO for rehab placement    SW met w/ pt's dtr - pt was currently working w/ therapy at this time. Dtr stated they have been looking at rehab facilities for pt.  Dtr stated they are interested in [place], but are still touring more facil

## 2017-12-07 NOTE — PLAN OF CARE
Problem: Patient Centered Care  Goal: Patient preferences are identified and integrated in the patient's plan of care  Interventions:  - What would you like us to know as we care for you? Communicating with patient to ensure needs are met.   - Provide timel METABOLIC/FLUID AND ELECTROLYTES - ADULT  Goal: Electrolytes maintained within normal limits  INTERVENTIONS:  - Monitor labs and rhythm and assess patient for signs and symptoms of electrolyte imbalances  - Administer electrolyte replacement as ordered  -

## 2017-12-07 NOTE — PLAN OF CARE
Problem: Patient Centered Care  Goal: Patient preferences are identified and integrated in the patient's plan of care  Interventions:  - What would you like us to know as we care for you? Communicating with patient to ensure needs are met.   - Provide timel OT/PT consult to assist with strengthening/mobility  - Encourage toileting schedule   Outcome: Progressing  Bed is in the low and locked position. Nonskid socks are on. Call light within reach. Patient using call light appropriately.  Safety precautions in

## 2017-12-07 NOTE — PLAN OF CARE
Problem: Patient Centered Care  Goal: Patient preferences are identified and integrated in the patient's plan of care  Interventions:  - What would you like us to know as we care for you? Communicating with patient to ensure needs are met.   - Provide timel maintained within normal limits  INTERVENTIONS:  - Monitor labs and rhythm and assess patient for signs and symptoms of electrolyte imbalances  - Administer electrolyte replacement as ordered  - Monitor response to electrolyte replacements, including rhyth

## 2017-12-07 NOTE — PROGRESS NOTES
[facility]  Hospitalist Progress  Note     Alexandria Garcia Patient Status:  Inpatient    1929  80year old CSN [id number]   Location 559/559-A Attending Leopoldo Bruns, MD   Hosp Day # 2 PCP RAHEEM Quinones MD     ASSESSMENT/PLAN    A 83.2  84.7  85.3   MCH  26.4*  26.5*  26.4*   MCHC  31.8*  31.3*  31.0*   RDW  18.0*  18.0*  18.6*   WBC  12.4*  11.6*  11.7*   PLT  270  242  231     Recent Labs   Lab  12/05/17   1648  12/06/17   0645  12/07/17   0639   GLU  106*  74  114*   BUN  39*  35

## 2017-12-07 NOTE — CM/SW NOTE
CÉSAR followed up w/ dtr/[place] in regards to discharge plans. Dtr stated she has not had the opportunity to tour facilities and is unsure when she will be able to.  CÉSAR did inform dtr that anticipated d/c is soon and would encourage to tour facilities as soon a

## 2017-12-07 NOTE — PHYSICAL THERAPY NOTE
PHYSICAL THERAPY TREATMENT NOTE - INPATIENT    Room Number: 876/201-P       Presenting Problem: fall with rib fracture 6 and 7 th    Problem List  Principal Problem:    Multiple falls  Active Problems:    Multiple closed fractures of ribs of right side lying on back to sitting on the side of the bed?: A Lot   How much help from another person does the patient currently need. ..   -   Moving to and from a bed to a chair (including a wheelchair)?: A Little   -   Need to walk in hospital room?: A Little   - in preparation for discharge.    Goal #5   Current Status  in progress   Goal #6     Goal #6  Current Status

## 2017-12-08 NOTE — PLAN OF CARE
Problem: Patient Centered Care  Goal: Patient preferences are identified and integrated in the patient's plan of care  Interventions:  - What would you like us to know as we care for you? Communicating with patient to ensure needs are met.   - Provide timel fall precautions in place, bed alarm on    Problem: METABOLIC/FLUID AND ELECTROLYTES - ADULT  Goal: Electrolytes maintained within normal limits  INTERVENTIONS:  - Monitor labs and rhythm and assess patient for signs and symptoms of electrolyte imbalances

## 2017-12-08 NOTE — CM/SW NOTE
CÉSAR was informed pt is able to d/c today - [facility] able to accept    [facility] agreeable w/ 2p d/c time  CÉSAR contacted pt's dtr/[place] to inform - agreeable w/ d/c to [facility] @ 2p.  CÉSAR encouraged pt dtr to tour facility prior to pt's d/c if dtr did not tour facility already

## 2017-12-08 NOTE — PLAN OF CARE
Problem: Patient Centered Care  Goal: Patient preferences are identified and integrated in the patient's plan of care  Interventions:  - What would you like us to know as we care for you? Communicating with patient to ensure needs are met.   - Provide timel Patient wearing nonskid socks. Call light and belongings within reach. Patient using call light appropriately.     Problem: METABOLIC/FLUID AND ELECTROLYTES - ADULT  Goal: Electrolytes maintained within normal limits  INTERVENTIONS:  - Monitor labs and rhyt

## 2017-12-08 NOTE — DISCHARGE SUMMARY
[facility] HOSPITALIST  DISCHARGE SUMMARY     Bambi Rush Patient Status:  Inpatient    1929 MRN [id number]   Location [facility] 5SW/SE Attending Ellis Sweeney MD   Hosp Day # 3 PCP RAHEEM Martinez MD     DATE OF ADMISSION: 2017  DA Patient understands to return to the emergency room for increased pain fever chest pain shortness of breath or other concerning symptoms.     PHYSICAL EXAM:  Temp:  [97.4 °F (36.3 °C)-97.9 °F (36.6 °C)] 97.4 °F (36.3 °C)  Pulse:  [72-75] 73  Resp:  [16-18] 1,000 mcg by mouth daily. Refills:  0     DiphenhydrAMINE HCl 25 MG Tabs  Commonly known as:  DIPHENHIST      Take 25 mg by mouth every 6 (six) hours as needed for Itching.    Refills:  0     Econazole Nitrate 1 % Crea  Commonly known as:  SPECTAZOLE [facility] [address]  [facility] [address]  [phone]    In 2 weeks  Post Discharge Followup    The above plan and follow-up instructions were reviewed with the patient and they verbalized understanding and agreement.   They understand to return to the emergenc

## 2017-12-10 PROBLEM — E87.5 HYPERKALEMIA: Status: ACTIVE | Noted: 2017-01-01

## 2017-12-10 PROBLEM — K92.2 GASTROINTESTINAL HEMORRHAGE, UNSPECIFIED GASTROINTESTINAL HEMORRHAGE TYPE: Status: ACTIVE | Noted: 2017-01-01

## 2017-12-10 PROBLEM — K92.2 GASTROINTESTINAL HEMORRHAGE: Status: ACTIVE | Noted: 2017-01-01

## 2017-12-10 PROBLEM — N17.9 ACUTE KIDNEY INJURY (HCC): Status: ACTIVE | Noted: 2017-01-01

## 2017-12-10 PROBLEM — D64.9 ANEMIA: Status: ACTIVE | Noted: 2017-01-01

## 2017-12-10 PROBLEM — R57.8 HEMORRHAGIC SHOCK (HCC): Status: ACTIVE | Noted: 2017-01-01

## 2017-12-10 PROBLEM — R73.9 HYPERGLYCEMIA: Status: ACTIVE | Noted: 2017-01-01

## 2017-12-10 PROBLEM — E87.3 RESPIRATORY ALKALOSIS: Status: ACTIVE | Noted: 2017-01-01

## 2017-12-10 PROBLEM — I95.9 HYPOTENSION, UNSPECIFIED HYPOTENSION TYPE: Status: ACTIVE | Noted: 2017-01-01

## 2017-12-10 PROBLEM — E87.20 METABOLIC ACIDOSIS: Status: ACTIVE | Noted: 2017-01-01

## 2017-12-10 PROBLEM — R79.89 AZOTEMIA: Status: ACTIVE | Noted: 2017-01-01

## 2017-12-10 PROBLEM — E87.2 METABOLIC ACIDOSIS: Status: ACTIVE | Noted: 2017-01-01

## 2017-12-10 NOTE — ED INITIAL ASSESSMENT (HPI)
Pt to ED via EMS from [address] [place] [address] c/o SOB, hypotension and fall today around 1400. Pt is recently admitted to [address] [place] [address] for rehab for a rib fracture from a previous fall.

## 2017-12-10 NOTE — ED PROVIDER NOTES
Patient Seen in: [facility] AND [facility] Emergency Department    History   Patient presents with:  Dyspnea MATEO SOB (respiratory)  Hypotension (cardiovascular)  Fall (musculoskeletal, neurologic)    Stated Complaint:     HPI    Patient presents emergency depart Review of Systems    Positive for stated complaint:   Other systems are as noted in HPI. Constitutional and vital signs reviewed. All other systems reviewed and negative except as noted above.     Physical Exam   ED Triage Vitals  BP: (!) 77/53 (*)     GFR, -American 30 (*)     All other components within normal limits   TROPONIN I, 0 HOUR - Abnormal; Notable for the following:     Troponin 0.07 (*)     All other components within normal limits   LACTIC ACID, PLASMA - Abnormal; Notable for -----------         ------                     ABORH (BLOOD ERXE)[[id number]]                               Final result               ANTIBODY [id number]]                                  Final result                 Please view results for these ryan testing    Pulse Ox: 94%, Normal, compensated on oxygen    Cardiac Monitor: Pulse Readings from Last 1 Encounters:  12/10/17 : 67  , sinus,      Radiology findings: Xr Chest Ap Portable  (cpt=71010)    Result Date: 12/10/2017  CONCLUSION:  1.  Moderate card

## 2017-12-11 PROBLEM — Z71.89 GOALS OF CARE, COUNSELING/DISCUSSION: Status: ACTIVE | Noted: 2017-01-01

## 2017-12-11 PROBLEM — Z71.89 ADVANCE CARE PLANNING: Status: ACTIVE | Noted: 2017-01-01

## 2017-12-11 PROBLEM — A41.9 SEPSIS (HCC): Status: ACTIVE | Noted: 2017-01-01

## 2017-12-11 NOTE — CONSULTS
Camron Jackson 89  Report of GI Consultation    Deborah Godinez Patient Status:  Emergency    1929 MRN [id number]   Location [address] Attending Donnelly Saint, MD   Hosp Day # 0 PCP MD LEONA Jaquez profound malaise, possibly sense of impending doom. She is despairing. Ms. Gaitan Pitchcorwin and her daughter at bedside deny previous concern for gastric ulcers, ulcer bleeding, GI bleeding, colonic or diverticular bleed.     GI and abdominal history is significa MESH    Family History  Family History   Problem Relation Age of Onset   • Heart Disorder Mother      arrythmia   • Ovarian Cancer Sister 61   • Breast Cancer Self      2000       Social History  Patient Guardian Status:  Not on file.     Other Topics rash    Results:     Laboratory Data:    Lab Results  Component Value Date   WBC 17.2 (H) 12/10/2017   HGB 11.4 (L) 12/10/2017    12/10/2017   CREATSERUM 1.36 12/08/2017   BUN 27 (H) 12/08/2017    (L) 12/08/2017   K 4.6 12/08/2017   CO2 19 (L) bleed or severe colonic ischemia. Mild to moderate pain and current abdominal exam findings would argue against intestinal intussusception, volvulus, strangulation. Recommendations:    · Efforts are underway to resuscitate and stabilize Ms. Concetta Keyes.   Ce

## 2017-12-11 NOTE — PROGRESS NOTES
Night MD - CCU Admission    Impressions:  ---Hypotension, some combination of GI bleed and possible sepsis  ---Melena -> GI bleed, source uncertain; has been on ASA  ---Coagulopathy, with INR 1.9, not on warfarin; suspect nutritional etiology; to R/O hepat also.    -------------------------------------------------------------------------------------------------------------------------------------------  79 y/o woman seen in CCU. Admits to generalized abdominal and chest pain.   Says always feels cold, but no BREAKFAST Disp: 90 tablet Rfl: 0   ZOLPIDEM TARTRATE 5 MG Oral Tab TAKE ONE TABLET BY MOUTH AT BEDTIME AS NEEDED FOR SLEEP Disp: 90 tablet Rfl: 0   Metoprolol Succinate ER 25 MG Oral Tablet 24 Hr Take 25 mg by mouth daily.  Disp:  Rfl:    Econazole Nitrate B, with 3+ B pedal edema       Recent Results (from the past 72 hour(s))  -MAGNESIUM   Collection Time: 12/08/17  5:07 AM   Result Value Ref Range   Magnesium 1.8 1.8 - 2.5 mg/dL   -BASIC METABOLIC PANEL (8)   Collection Time: 12/08/17  5:07 AM   Result Va Urine Negative Negative mg/dL   Squamous Epi.  Cells Few /HPF   Hyaline Casts 5 0 - 5 /LPF   WBC Urine 3 0 - 5 /HPF   RBC URINE 2 0 - 3 /HPF   Bacteria Urine Few (A) None Seen /HPF   -RAINBOW DRAW BLUE   Collection Time: 12/10/17  4:40 PM   Result Value Ref K/UL   Eosinophil Absolute 0.0 0.0 - 0.7 K/UL   Basophil Absolute 0.0 0.0 - 0.2 K/UL   Metamyelocyte Absolute 0.34 (H) 0 K/UL   ANISOCYTOSIS 1+    POIKILOCYTOSIS 1+    Hypochromia 1+    Polychromasia 1+    -PROTHROMBIN TIME (PT)   Collection Time: 12/10/17 Barcode 9500    -PREPARE RBC   Collection Time: 12/10/17  8:06 PM   Result Value Ref Range   Blood Product [id number]    Unit Number [id number]    UNIT ABO B    UNIT RH POS    Product Status Issued    Expiration Date [id number]    Blood Type Barcode 7

## 2017-12-11 NOTE — PROGRESS NOTES
[facility]    Progress Note    Floyde Gitelman Patient Status:  Inpatient    1929 MRN [id number]   Location [facility] 2W/SW Attending Brendan Beth MD   Hosp Day # 1 PCP RAHEEM Erazo MD     Subjective:     Constituti h/o breast cancer      8- coagulapathy   Likely DIC   Supportive care      9- DNR / DNI per pt and family wishes    Goal of care / d/w pt and daughter in length today , they interested in comfort measures only   Will consult palliative care   D/w Dr Sathya Vargas state of illness, I anticipate that, after discharge, patient will require TBD. Bailey Andrews.  Jose Juan Bhatt MD  12/11/2017

## 2017-12-11 NOTE — CM/SW NOTE
SW received order for hospice, inpatient eval w/ [facility]. SW made referral to [facility] via [employer]. Paula Lutz,  -Ext.  [phone]

## 2017-12-11 NOTE — H&P
[address] Patient Status:  Inpatient    1929 MRN [id number]   Location [facility] 2W/SW Attending Elizabeth Coon MD   Hosp Day # 0 PCP RAHEEM Maguire MD     Date:  12/10/2017  Date Unspecified essential hypertension      Past Surgical History:  No date: APPENDECTOMY  2012: CATARACT EXTRACTION  No date: CHOLECYSTECTOMY  10-7-08: COLECTOMY  1980,2008: COLONOSCOPY  1980: CORRECT BUNION,SIMPLE  04/07/2013: ELECTROCARDIOGRAM, COMPLETE Powder, Breath Activated Inhale 1 puff into the lungs daily. gabapentin 300 MG Oral Cap Take 1 capsule (300 mg total) by mouth 3 (three) times daily. latanoprost 0.005 % Ophthalmic Solution Place 1 drop into both eyes daily.      folic acid 562 MCG Or Results:     Lab Results  Component Value Date   WBC 17.2 (H) 12/10/2017   HGB 11.4 (L) 12/10/2017   HCT 37.6 12/10/2017    12/10/2017   CREATSERUM 1.93 (H) 12/10/2017   BUN 41 (H) 12/10/2017    12/10/2017   K 5.1 12/10/2017    12/ wishes      Overall prognosis if guarded     40 min cct         Luba Estrada.  Jennifer Sauceda MD  12/10/2017

## 2017-12-11 NOTE — HOSPICE RN NOTE
Bronwyn ALLEN from [facility] met with daughter and the daughter agreed to sign consents for Hospice. Patient will be GIP and admitted with [facility]. POC was discussed with Dr Terrance Irwin and Dr Wolf Whitmore. POC was discussed with Katie ALLEN.

## 2017-12-11 NOTE — PROGRESS NOTES
[facility]  Hospitalist Progress  Note     Ly Cardona Patient Status:  Inpatient    1929  80year old CSN [id number]   Location -A Attending Lou Sharp MD   Hosp Day # 1 PCP RAHEEM Blood MD     ASSESSMENT/PLAN    S 2035  12/11/17   0515   GLU  126*  143*  139*   BUN  40*  41*  38*   CREATSERUM  1.92*  1.93*  1.81*   GFRAA  30*  30*  32*   GFRNAA  25*  25*  26*   CA  8.2*  8.3*  8.7   ALB   --   2.6*  3.0*   NA  136  136  137   K  5.2*  5.1  4.6   CL  111*  110  108

## 2017-12-11 NOTE — PROGRESS NOTES
GI Service ADDENDUM:    Ms. Severa Sable has arrived in ICU. Blood transfusion and fluids are running. She remains profoundly ill. She smells of melena. She does not appear to be in any distress. She describes ongoing pain.     Blood pressure is currently 73

## 2017-12-11 NOTE — CM/SW NOTE
Patient is a readmission from [facility] rehab for SOB and low BP. Per chart review, patient will be transitioning to hospice. CÉSAR/CM to follow for any other needs.     Laura Weiss, [address] [place] [address]

## 2017-12-11 NOTE — PROGRESS NOTES
[facility] Dosing Service  Antibiotic Dosing    Kenroy Dueñas is a 80year old female for whom pharmacy is dosing Zosyn for treatment of  sepsis.  .  Other antibiotics (Not dosed by pharmacy): none    Allergies: is allergic to [place]; adhesive tape; an

## 2017-12-11 NOTE — PLAN OF CARE
[facility] notified. Reference #[id number]. [facility] reported patient is not a candidate for donation and we do not need to call after death.

## 2017-12-11 NOTE — CONSULTS
[address] Patient Status:  Inpatient    1929 MRN [id number]   Location [facility] 2W/SW Attending Carmina Puga MD   Hosp Day # 1 PCP RAHEEM Lopez MD     Date of Cons all over, but is unable to describe further. RR 28. She remains on Levophed gtt for hypotension, B/P in 70's. Appetite has been very poor per dtr report the past month,  current BMI is 35, weight is 195 pounds.  + diffuse abdominal pain, denies nausea, vomi to. I discussed typical EOL symptoms which may arise and medications utilized to maintain comfort. I discussed she appears GIP appropriate with her hypotension and need for symptom management with ongoing respiratory failure.  I discussed dc'ing Levophed gt TAPE  Latex                   Rash    Medications:     Current Facility-Administered Medications:   •  [DISCONTINUED] Pantoprazole Sodium (PROTONIX) 80 mg in sodium chloride 0.9 % 100 mL IVPB, 80 mg, Intravenous, Once **AND** Pantoprazole Sodium (PROTONIX) 12/11/2017    12/11/2017   CO2 19 (L) 12/11/2017    (H) 12/11/2017   CA 8.7 12/11/2017   ALB 3.0 (L) 12/11/2017   ALKPHO 65 12/11/2017   BILT 1.3 (H) 12/11/2017   TP 6.0 12/11/2017   AST 32 12/11/2017   ALT 22 12/11/2017   MG 1.8 12/08/2017 directive  Healthcare Agent Appointed: Yes  Healthcare Agent's Name: [email or web address] Agent's Phone Number: [phone]  Pre-existing DNR/DNI Order: Yes, notify physician for order  Describe Patient Wishes: DNR,DNI, no g-tube, comfort care only    S bound status, dep 6/6 ADL's, frequent rehospitalizations=hospice appropriate    Emotion support provided to patient/family today: Yes      Palliative Care Follow Up:    A total of 55 minutes were spent on this consult, which included all of the following:d

## 2017-12-11 NOTE — PROGRESS NOTES
[facility]  GI SERVICE PROGRESS NOTE    Deborah Godinez Patient Status:  Inpatient    1929 MRN [id number]   Location [facility] 2W/SW Attending Mina Caballero MD   Hosp Day # 1 PCP RAHEEM Garzon MD       Subjective:     Latia Bryan 12/10/17   1640  12/11/17   0008   TROP  0.07*  0.13*       No results for input(s): URINE, CULTI, BLDSMR in the last 72 hours. Xr Chest Ap Portable  (cpt=71010)    Result Date: 12/10/2017  CONCLUSION:  1.  Moderate cardiomegaly and mild pulmonary jacky pneumonia. · Abnormal BUN and creatinine, decreasing urine output concerning for evolving renal failure. Daughter not interested in preparation for hemodialysis at this point. · Follow H&H, above labs for sepsis parameters.   · FFP and Vitamin K given 12

## 2017-12-11 NOTE — PROGRESS NOTES
[facility]      Sepsis Reassessment Note    /69   Pulse 90   Temp 97.8 °F (36.6 °C) (Temporal)   Resp 20   Ht 5' 2\" (1.575 m)   Wt 195 lb (88.5 kg)   SpO2 90%   BMI 35.67 kg/m²      10:34 PM    Cardiac:  Regularity: Regular  Rate:

## 2017-12-12 NOTE — PLAN OF CARE
Patient passed at 18:35. Dr. Bret Spurling notified on call for Dr. Rosalie Rahman. Dr. Sarahy Aguilar notified. Dr. Jabier Wisdom notified. Coronary called and determined not to be a coronar case and released body. [facility] notified earlier and patient is not a candidate.

## 2017-12-12 NOTE — H&P
[facility]  HISTORY AND PHYSICAL       Cortes Cole Patient Status:  Inpatient    1929  80year old CSN [id number]   Location -A Attending No att. providers found     PCP RAHEEM Carter MD     ASSESSMENT/PLAN    Shock, sep EXTRACTION  No date: CHOLECYSTECTOMY  10-7-08: COLECTOMY  9802,2612: COLONOSCOPY  1980: CORRECT BUNION,SIMPLE  04/07/2013: ELECTROCARDIOGRAM, COMPLETE      Comment: SCANNED TO MEDIA TAB - 04/07/2013  No date: HYSTERECTOMY  2000: LUMPECTOMY RIGHT  4-8-13: O 1640  12/11/17   0515   RBC  4.34  4.84   HGB  11.4*  12.9   HCT  37.6  41.0   MCV  86.7  84.7   MCH  26.3*  26.6*   MCHC  30.4*  31.4*   RDW  19.1*  17.4*   WBC  17.2*  14.7*   PLT  230  245     Recent Labs   Lab  12/10/17   1640  12/10/17   2035  12/11/1

## 2017-12-12 NOTE — PROGRESS NOTES
This RN noted absence of spontaneous respiration, absence of heart tones, and absence of blood pressure. Time Of Death: 1835        This RN notified Dr. Corita Dakins and Otha Litten regarding expiration.      Additional notifications include: Mino Ch (ref #17

## 2017-12-12 NOTE — DISCHARGE SUMMARY
[place] HOSPITALIST  DISCHARGE SUMMARY     Harpreet Souza Patient Status:  Inpatient    1929 MRN [id number]   Location [facility] 2W/SW Attending No att. providers found   [facility] Day # 1 PCP RAHEEM Webster MD     DATE OF ADMISSION: 20

## 2019-02-28 VITALS
BODY MASS INDEX: 36.25 KG/M2 | DIASTOLIC BLOOD PRESSURE: 60 MMHG | HEIGHT: 61 IN | SYSTOLIC BLOOD PRESSURE: 100 MMHG | WEIGHT: 192 LBS | HEART RATE: 60 BPM

## 2019-03-01 VITALS
HEIGHT: 61 IN | BODY MASS INDEX: 34.55 KG/M2 | SYSTOLIC BLOOD PRESSURE: 98 MMHG | HEART RATE: 52 BPM | RESPIRATION RATE: 18 BRPM | WEIGHT: 183 LBS | DIASTOLIC BLOOD PRESSURE: 66 MMHG

## 2020-09-16 NOTE — TELEPHONE ENCOUNTER
Forms signed by Dr. Sakshi Canada and faxed to [phone].  [facility] written material/verbal instruction/audio

## 2022-11-06 NOTE — TELEPHONE ENCOUNTER
[facility]. I have pain in my back since last night

## (undated) NOTE — MR AVS SNAPSHOT
[address]  [address]  [phone]               Thank you for choosing us for your health care visit with Singh Jeffery DPM.  We are glad to serve you and happy to provide yo Take 1,000 mcg by mouth daily. DiphenhydrAMINE HCl 25 MG Tabs   Take 25 mg by mouth every 6 (six) hours as needed for Itching.    Commonly known as:  DIPHENHIST           Econazole Nitrate 1 % Crea   Apply to affected areas 1-2x/day   Commonly reyo medications prescribed for you. Read the directions carefully, and ask your doctor or other care provider to review them with you.             ePantry     Call the [employer] for assistance with your inactive ePantry account    If you have questions, you can

## (undated) NOTE — ED AVS SNAPSHOT
Michael Zachary   MRN: [id number]    Department:  [facility] Emergency Department   Date of Visit:  11/22/2017           Disclosure     Insurance plans vary and the physician(s) referred by the ER may not be covered by your plan.  Please contact within the next three months to obtain basic health screening including reassessment of your blood pressure.     IF THERE IS ANY CHANGE OR WORSENING OF YOUR CONDITION, CALL YOUR PRIMARY CARE PHYSICIAN AT ONCE OR RETURN IMMEDIATELY TO THE EMERGENCY DEPARTMEN

## (undated) NOTE — MR AVS SNAPSHOT
[address]  [address]  [address] [facility]  [phone]               Thank you for choosing us for your health care visit with RAHEEM Hatfield MD.  We are glad to serve you and happy to provide you with this summar cyanocobalamin 1000 MCG Tabs   Take 1,000 mcg by mouth daily. DiphenhydrAMINE HCl 25 MG Tabs   Take 25 mg by mouth every 6 (six) hours as needed for Itching.    Commonly known as:  DIPHENHIST           Econazole Nitrate 1 % Crea   [facility] [address]     Phone:  [phone]    - Econazole Nitrate 1 % Crea  - Omeprazole 20 MG [facility]      You can get these medications from any pharmacy     Bring a paper prescription for each of these medications    - hydrocodone-acetaminophen

## (undated) NOTE — MR AVS SNAPSHOT
[facility]  [address] [place] [address], [address] [place] [address]  [phone]               Thank you for choosing us for your health care visit with Mace Lover, ALE.  We are glad to serve you and happy to provide you with this summ Apply to affected areas 1-2x/day   Commonly known as:  SPECTAZOLE           folic acid 1 MG Tabs   Take 1 mg by mouth daily. Commonly known as:  FOLVITE           furosemide 20 MG Tabs   Take 1 tablet (20 mg total) by mouth 2 (two) times daily.    Commonl medical emergencies, dial 911. Visit [email or web address] to learn more.            Visit [facility] online at  [email or web address]

## (undated) NOTE — IP AVS SNAPSHOT
Patient Demographics     Address  [address] [place] [address] Phone  [phone] [facility]) *Preferred*  [phone] [facility])      Emergency Contact(s)     Name Briseida 29 Daughter [phone]      All Take 25 mg by mouth every 6 (six) hours as needed for Itching. Econazole Nitrate 1 % Crea  Commonly known as:  SPECTAZOLE  Next dose due: Tomorrow 12/9/17      Apply to affected areas 1-2x/day   ARHEEM Garzon MD         folic acid 222 MCG Tabs Sonia Centeno MD               Where to Get Your Medications      Please  your prescriptions at the location directed by your doctor or nurse    Bring a paper prescription for each of these medications  hydrocodone-acetaminophen 7.5-325 MG Tabs 12/07/17 2100 Given      [id number] magnesium oxide (MAG-OX) tab 200 mg (And Linked Group #2) 12/08/17 0835 Given      [id number] umeclidinium-vilanterol (ANORO ELLIPTA) 62.5-25 MCG/INH inhaler 1 puff 12/08/17 0836 Given            LEFT LOWER ABDOMEN     Lamount Antes Blood — 12/08/17 0507          Components    Component Value Reference Range Flag Lab   Magnesium 1.8 1.8 - 2.5 mg/dL [facility]            BASIC METABOLIC PANEL (8) [[id number]] (Abnormal)  Resulted: 12/08/17 0538, Result status: Final result   Orderin PATIENT'S NAME: Fredy Mace   ATTENDING PHYSICIAN: Soledad Lares MD   PATIENT ACCOUNT#:   [id number]    LOCATION:  [place] [address]  MEDICAL RECORD #:   [id number]       YOB: 1929  ADMISSION DATE:       12/05/2017    HISTORY AND PHY use.  Has oxygen at home. No current alcohol, drug, or tobacco use. Gait is unsteady in general.    REVIEW OF SYSTEMS:  The patient reports generalized weakness in the last week or so. She said her gait has been unsteady. She feels generally weak.   She Adam garcia, oxygen protocol.  to evaluate patient for possible rehab placement. The patient is not safe to go back to her living conditions, considering her multiple falls. Further recommendations to follow.     Dictated By Yandy Daily chloride 94, estimated GFR of 30 which is around her baseline. Urinalysis showed no evidence of urinary tract infection.   X-ray of the chest showed acute angulated anterolateral right sixth and seventh rib fracture deformities, suspected small right pleur Commonly known as:  COLTON  What changed:  · how much to take  · how to take this  · when to take this  · additional instructions      40mg on mondays, Wednesday, fridays   Quantity:  60 tablet  Refills:  0        CONTINUE taking these medications      Inst Levothyroxine Sodium 112 MCG Tabs  Commonly known as:  SYNTHROID, LEVOTHROID      TAKE ONE TABLET BY MOUTH ONCE DAILY BEFORE BREAKFAST   Quantity:  90 tablet  Refills:  0     Metoprolol Succinate ER 25 MG Tb24  Commonly known as:   Toprol XL      Take 25 mg discharge from the hospital.[WW.1]          Electronically signed by Giovana Allen MD on 12/8/2017 12:00 PM   Attribution Key    WW.1 - Giovana Allen MD on 12/8/2017 11:57 AM  WW.2 - Giovana Allen MD on 12/8/2017 11:58 AM                        Physical BALANCE                                                                                                                     Static Sitting: Good  Dynamic Sitting: Good           Static Standing: Fair -  Dynamic Standing: Poor +    ACTIVITY TOLERANCE  O2 Sa Current Status  Pt demo with mod assist.    Goal #2 Patient is able to demonstrate transfers Sit to/from Stand at assistance level: supervision with walker - rolling   Goal #2  Current Status  Pt demo with min assist x 1 with rw., spt with mod assist x 1. difficulty with mobility at home, unable to get up out of chairs, limited amb distance, difficulty with personal hygiene. Pt lives alone and will benefit from rehab stay at this time.      Patient will benefit from continued IP PT services to address these Drives: No  Patient Owned Equipment: Rolling walker       Prior Level of [place]: Pt states amb indep house hold distances, difficulty with adl, getting up from chairs.      SUBJECTIVE  My ribs hurt    PHYSICAL THERAPY EXAMINATION     OBJECTIVE     Ival Grit Pattern: Shuffle  Stoop/Curb Assistance: Not tested       Bed Mobility:Pt demo with max assist x 1     Transfers:Pt demo with min assist x 1 with rw and cues.      Exercise/Education Provided:  Bed mobility  Body mechanics  Functional activity tolerated  Ga Evaluation Date: 12/6/2017  Type of Evaluation: Initial[SF.1]  Presenting Problem:  (multiple falls with Rt 6 and 7th rib fx)[SF.2]    Physician Order: IP Consult to Occupational Therapy  Reason for Therapy: ADL/IADL Dysfunction and Discharge Planning    O • Breast lump 2000   • Cataract of left eye 1998   • Cataract of right eye 9-25-12   • Colon polyp 1980   • Coronary artery disease    • Glaucoma 1/2010   • History of D&C 2000   • Hypothyroidism    • Kidney stones 1998   • Spinal stenosis 4-27-07   • Unsp Upper extremity ROM is within functional limits    STRENGTH ASSESSMENT  Upper extremity strength is within functional limits       ACTIVITIES OF DAILY LIVING ASSESSMENT  AM-PAC ‘6-Clicks’ Inpatient Daily Activity Short Form  How much help from another pers SF.2 - Christina Sinclair OT on 12/6/2017  1:35 PM                     Video Swallow Study Notes     No notes of this type exist for this encounter. SLP Notes     No notes of this type exist for this encounter.             Immunizations     Name Date

## (undated) NOTE — IP AVS SNAPSHOT
[facility]            (For Outpatient Use Only) Initial Admit Date: 12/5/2017   Inpt/Obs Admit Date: Inpt: 12/5/17 / Obs: N/A   Discharge Date:    Connie Mao:  [de-identified]   MRN: [de-identified]   CSN: [id number]        ENCOUNTER  Patient Class Subscriber ID:  Pt Rel to Subscriber:    Hospital Account Financial Class: Medicare    December 8, 2017